# Patient Record
Sex: FEMALE | Race: OTHER | Employment: UNEMPLOYED | URBAN - METROPOLITAN AREA
[De-identification: names, ages, dates, MRNs, and addresses within clinical notes are randomized per-mention and may not be internally consistent; named-entity substitution may affect disease eponyms.]

---

## 2023-11-03 ENCOUNTER — TELEPHONE (OUTPATIENT)
Age: 28
End: 2023-11-03

## 2023-11-10 ENCOUNTER — NURSE ONLY (OUTPATIENT)
Age: 28
End: 2023-11-10

## 2023-11-10 DIAGNOSIS — Z71.9 COUNSELED BY NURSE: ICD-10-CM

## 2023-11-10 DIAGNOSIS — Z71.89 COUNSELING AND COORDINATION OF CARE: Primary | ICD-10-CM

## 2023-11-10 NOTE — PROGRESS NOTES
Encounter completed via telephone call for enrollment in to [facility] Every [address] and coordination of care. Patient was referred by [facility] department and Dr. Elenita Anguiano and at [facility] due to a recent abnormal pap smear (needs to be scan in to Media). Patient had already called our screening line and meets qualifications for the [facility] program.  The  [employer] participation agreement was read to the patient over the phone. All questions answered. Patient would like to be enrolled in [facility] program and have Colposcopy Procedure. Colposcopy preparation instructions were reviewed. Patient would like to be seen as soon as possible and does not have a preference in day of the week or Gender of provider. Next available appt. Was scheduled for patient and a text message as requested by patient was sent. A [facility] packet was sent to patient to the address on file. All questions were answered, patient verbalized understating and agrees with plan. Dash Key RN      EV[employer] HISTORY QUESTIONNAIRE       No Yes Comments   Has a doctor ever seen or felt anything wrong with your breast? [x]                                  []                                     Have you ever had a breast biopsy? [x]                                  []                                          When and where was last mammogram performed? N/a    Have you ever been told that there was a problem on your mammogram?   No Yes Comments   []                                  []                                  N/a     Do you have breast implants? No Yes Comments   []                                  [x]                                       When was your last Pap test performed? 10/25/2023 - [facility] department     Have you ever had an abnormal Pap test?   No Yes Comments   [x]                                  []                                       Have you had a hysterectomy?    No Yes Comments (why)   [x]

## 2023-12-14 ENCOUNTER — OFFICE VISIT (OUTPATIENT)
Age: 28
End: 2023-12-14

## 2023-12-14 VITALS — WEIGHT: 132 LBS | SYSTOLIC BLOOD PRESSURE: 104 MMHG | BODY MASS INDEX: 20.48 KG/M2 | DIASTOLIC BLOOD PRESSURE: 60 MMHG

## 2023-12-14 DIAGNOSIS — R87.613 HGSIL (HIGH GRADE SQUAMOUS INTRAEPITHELIAL LESION) ON PAP SMEAR OF CERVIX: Primary | ICD-10-CM

## 2023-12-14 LAB
HCG, PREGNANCY, URINE, POC: NEGATIVE
VALID INTERNAL CONTROL, POC: YES

## 2023-12-14 NOTE — PROGRESS NOTES
breathing    Cardiovascular  Extremities: no peripheral edema    Gastrointestinal  Abdominal Examination: abdomen non-distended, non-tender to palpation, no masses present  Liver and spleen: no hepatomegaly present, spleen not palpable  Hernias: no hernias identified    Genitourinary  See below for procedure note    Skin  General Inspection: no rash, no lesions identified    Neurologic/Psychiatric  Mental Status:  Orientation: grossly oriented to person, place and time  Mood and Affect: mood normal, affect appropriate      Assessment/Plan:    1. HGSIL (high grade squamous intraepithelial lesion) on Pap smear of cervix  - AL COLPOSCOPY CERVIX BX CERVIX & ENDOCRV CURRETAGE  - AMB POC URINE PREGNANCY TEST, VISUAL COLOR COMPARISON  - Surgical Pathology; Future      Discussed her pap results and the recommendation for colposcopy. We discussed the colposcopy procedure. We discussed HPV and the pathogenesis of cervical dysplasia. Discussed the possible biopsy results and the management plan pending the results of her colposcopic biopsies - including follow-up cotesting or LEEP procedure. Will call with pathology results. Man Plummer MD      Procedure Note: Colposcopy    Olive Christianson is a  33 y.o.  female She presents for colposcopy for evaluation of a cervical abnormality with a pap smear abnormality consisting of HSIL. After being presented with the risks, benefits and alternatives has she signed a consent for the procedure. She states that she understands the need for the procedure and has no further questions. She was informed that she may experience discomfort. Procedure: She was positioned in the dorsal lithotomy position and a speculum was inserted into the vagina. Dilute acetic acid was applied to the cervix. The colposcope was used to visualize the cervix. Procedure: The transformation zone was completely visualized. Findings: This colposcopy was satisfactory.  The

## 2023-12-19 ENCOUNTER — PREP FOR PROCEDURE (OUTPATIENT)
Facility: HOSPITAL | Age: 28
End: 2023-12-19

## 2023-12-19 DIAGNOSIS — D06.9 CIN III (CERVICAL INTRAEPITHELIAL NEOPLASIA III): ICD-10-CM

## 2023-12-19 DIAGNOSIS — N87.1 CERVICAL INTRAEPITHELIAL NEOPLASIA II: ICD-10-CM

## 2023-12-20 ENCOUNTER — PREP FOR PROCEDURE (OUTPATIENT)
Facility: HOSPITAL | Age: 28
End: 2023-12-20

## 2023-12-20 DIAGNOSIS — N87.1 CERVICAL INTRAEPITHELIAL NEOPLASIA II: Primary | ICD-10-CM

## 2023-12-20 DIAGNOSIS — D06.9 CIN III (CERVICAL INTRAEPITHELIAL NEOPLASIA III): ICD-10-CM

## 2024-01-02 ENCOUNTER — TELEPHONE (OUTPATIENT)
Age: 29
End: 2024-01-02

## 2024-01-02 NOTE — TELEPHONE ENCOUNTER
Patient calling main office stating that she has received a bill for about $472 for her Colposcopy on 12/14/2023. I have reviewed the chart and will send a message to emsemble today asking to direct charge to [facility] corporate account. Discussed that she may get a bill next month due to that this print automatically every 30 days, patient should not received any more bills by the end of Feb. 2024 if she does, pt is to call us again to let us know. Pt verbalized understanding. Pt would like to know where she could go to get her Depo- provera, per chart reviewed she was referred to our [facility] program from [facility] so I have instructed the patient to follow up with them, pt stated that she will. Pt thankful for assistance. Mimi Yañez RN

## 2024-01-10 ENCOUNTER — TELEPHONE (OUTPATIENT)
Age: 29
End: 2024-01-10

## 2024-01-10 NOTE — TELEPHONE ENCOUNTER
The [facility]  was used. Pt was calling about bill. Reviewed with pt about message from 1/2/2024. Pt verbalizes understanding that colpo bill will be covered under [facility]. Pt also asking if program pays for vaginal ultrasound, explained that our program does not cover that . Pt is LILIAAN pt, so explained that she can discuss with them about getting financial assistance from [facility], Pt verbalized understanding and denies questions. VERNON HEAD, RN

## 2024-01-10 NOTE — TELEPHONE ENCOUNTER
Tc to the pt responding to her MyChart msg. She is requesting to reschedule her U/S appt. She was scheduled for [facility] first available is Fri 01/19/24, @ 10:00 am with 0930 am arrival. Building #1 outpatient registration. Bring your photo ID. The pt needs t arrive with a full bladder. She needs to drink clear liquids 32-48 hrs 1 hr before the test. The pt questioned asking where she is to go to have the BC injection Depo-vera. The pt was informed the [facility] does not do BC injections. She should check with her local HD. Kimi Costa RN

## 2024-01-19 ENCOUNTER — HOSPITAL ENCOUNTER (OUTPATIENT)
Facility: HOSPITAL | Age: 29
End: 2024-01-19
Attending: FAMILY MEDICINE

## 2024-01-19 ENCOUNTER — HOSPITAL ENCOUNTER (OUTPATIENT)
Facility: HOSPITAL | Age: 29
Discharge: HOME | End: 2024-01-19
Attending: FAMILY MEDICINE

## 2024-01-19 DIAGNOSIS — N92.6 IRREGULAR MENSTRUATION: ICD-10-CM

## 2024-01-19 DIAGNOSIS — R10.31 BILATERAL LOWER ABDOMINAL DISCOMFORT: ICD-10-CM

## 2024-01-19 DIAGNOSIS — R10.32 BILATERAL LOWER ABDOMINAL DISCOMFORT: ICD-10-CM

## 2024-01-19 PROCEDURE — 76830 TRANSVAGINAL US NON-OB: CPT

## 2024-01-19 PROCEDURE — 76856 US EXAM PELVIC COMPLETE: CPT

## 2024-01-23 ENCOUNTER — TELEPHONE (OUTPATIENT)
Age: 29
End: 2024-01-23

## 2024-01-23 NOTE — TELEPHONE ENCOUNTER
Called pt to follow up on message about bills. Explained that an OW will call her to schedule an appt.

## 2024-01-25 ENCOUNTER — TELEPHONE (OUTPATIENT)
Age: 29
End: 2024-01-25

## 2024-01-25 NOTE — H&P
Gynecology History and Physical    Name: Kiesha Nichole MRN: [id number] SSN: [id number]    YOB: 1995  Age: 28 y.o.  Sex: female       Subjective:      Chief complaint:  Cervical dysplasia    Kiesha is a 28 y.o.  female with a history of dysplasia.     She had a pap test that demonstrated HSIL. Colposcopy was done that demonstrated    Surgical pathology:  1. Cervix, 12:00, biopsy: Focal detached fragments of high-grade squamous intraepithelial lesion (QUINTON II/III).   2. Cervix, 7:00, biopsy: Focal low-grade squamous intraepithelial lesion (QUINTON I).   3. Endocervix, endocervical curettage: Focal detached fragments of high-grade squamous intraepithelial lesion (QUINTON II/III).     She is admitted for Procedure(s) (LRB):  COLD KNIFE CONIZATION (N/A).    The current method of family planning is Depo-Provera injections.    OB History          1    Para   1    Term   1       0    AB   0    Living   1         SAB   0    IAB   0    Ectopic        Molar        Multiple        Live Births                  Past Medical History:   Diagnosis Date    HGSIL on cytologic smear of cervix      Past Surgical History:   Procedure Laterality Date    ABDOMINOPLASTY      BREAST ENHANCEMENT SURGERY       Social History     Occupational History    Not on file   Tobacco Use    Smoking status: Never    Smokeless tobacco: Never   Vaping Use    Vaping Use: Never used   Substance and Sexual Activity    Alcohol use: Not Currently     Comment: occassional    Drug use: Never    Sexual activity: Yes     Partners: Male     Birth control/protection: Injection     No family history on file.     No Known Allergies  Prior to Admission medications    Medication Sig Start Date End Date Taking? Authorizing Provider   medroxyPROGESTERone (DEPO-PROVERA) 150 MG/ML injection Inject 1 mL into the muscle every 3 months    ProviderJorge MD   folic acid (FOLVITE) 400 MCG tablet Take 1 tablet by mouth daily     Provider, MD Jorge        Review of Systems:  A comprehensive review of systems was negative except for that written in the History of Present Illness.     Objective:     There were no vitals filed for this visit.    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 The patient understands the risks; any and all questions were answered to the patient's satisfaction.    Signed By:  Julia Topete MD     January 25, 2024

## 2024-01-25 NOTE — TELEPHONE ENCOUNTER
----- Message from Kiesha Nichole sent at 1/25/2024 10:36 AM EST -----  Regarding: Resultados ecografías   Contact: [phone]  Buen día Doctora, sería hargrove zee de decirme cómo ve los resultados de los exámenes  muchas greg

## 2024-01-26 ENCOUNTER — ANESTHESIA (OUTPATIENT)
Facility: HOSPITAL | Age: 29
End: 2024-01-26

## 2024-01-26 ENCOUNTER — HOSPITAL ENCOUNTER (OUTPATIENT)
Facility: HOSPITAL | Age: 29
Setting detail: OUTPATIENT SURGERY
Discharge: HOME | End: 2024-01-26
Attending: OBSTETRICS & GYNECOLOGY | Admitting: OBSTETRICS & GYNECOLOGY

## 2024-01-26 ENCOUNTER — ANESTHESIA EVENT (OUTPATIENT)
Facility: HOSPITAL | Age: 29
End: 2024-01-26

## 2024-01-26 VITALS
DIASTOLIC BLOOD PRESSURE: 60 MMHG | OXYGEN SATURATION: 99 % | TEMPERATURE: 98.2 F | RESPIRATION RATE: 12 BRPM | SYSTOLIC BLOOD PRESSURE: 111 MMHG | HEART RATE: 66 BPM

## 2024-01-26 DIAGNOSIS — D06.9 CIN III (CERVICAL INTRAEPITHELIAL NEOPLASIA III): ICD-10-CM

## 2024-01-26 DIAGNOSIS — N87.1 CERVICAL INTRAEPITHELIAL NEOPLASIA II: ICD-10-CM

## 2024-01-26 LAB — HCG UR QL: NEGATIVE

## 2024-01-26 PROCEDURE — 6370000000 HC RX 637 (ALT 250 FOR IP): Performed by: ANESTHESIOLOGY

## 2024-01-26 PROCEDURE — 2580000003 HC RX 258: Performed by: ANESTHESIOLOGY

## 2024-01-26 PROCEDURE — 2500000003 HC RX 250 WO HCPCS: Performed by: OBSTETRICS & GYNECOLOGY

## 2024-01-26 PROCEDURE — 88307 TISSUE EXAM BY PATHOLOGIST: CPT

## 2024-01-26 PROCEDURE — 3700000001 HC ADD 15 MINUTES (ANESTHESIA): Performed by: OBSTETRICS & GYNECOLOGY

## 2024-01-26 PROCEDURE — 6370000000 HC RX 637 (ALT 250 FOR IP): Performed by: OBSTETRICS & GYNECOLOGY

## 2024-01-26 PROCEDURE — 7100000010 HC PHASE II RECOVERY - FIRST 15 MIN: Performed by: OBSTETRICS & GYNECOLOGY

## 2024-01-26 PROCEDURE — 88305 TISSUE EXAM BY PATHOLOGIST: CPT

## 2024-01-26 PROCEDURE — 3600000002 HC SURGERY LEVEL 2 BASE: Performed by: OBSTETRICS & GYNECOLOGY

## 2024-01-26 PROCEDURE — 57520 CONIZATION OF CERVIX: CPT | Performed by: OBSTETRICS & GYNECOLOGY

## 2024-01-26 PROCEDURE — 6360000002 HC RX W HCPCS: Performed by: NURSE ANESTHETIST, CERTIFIED REGISTERED

## 2024-01-26 PROCEDURE — 7100000011 HC PHASE II RECOVERY - ADDTL 15 MIN: Performed by: OBSTETRICS & GYNECOLOGY

## 2024-01-26 PROCEDURE — 3600000012 HC SURGERY LEVEL 2 ADDTL 15MIN: Performed by: OBSTETRICS & GYNECOLOGY

## 2024-01-26 PROCEDURE — 2500000003 HC RX 250 WO HCPCS: Performed by: NURSE ANESTHETIST, CERTIFIED REGISTERED

## 2024-01-26 PROCEDURE — 3700000000 HC ANESTHESIA ATTENDED CARE: Performed by: OBSTETRICS & GYNECOLOGY

## 2024-01-26 PROCEDURE — 7100000000 HC PACU RECOVERY - FIRST 15 MIN: Performed by: OBSTETRICS & GYNECOLOGY

## 2024-01-26 PROCEDURE — 81025 URINE PREGNANCY TEST: CPT

## 2024-01-26 PROCEDURE — 2709999900 HC NON-CHARGEABLE SUPPLY: Performed by: OBSTETRICS & GYNECOLOGY

## 2024-01-26 PROCEDURE — 7100000001 HC PACU RECOVERY - ADDTL 15 MIN: Performed by: OBSTETRICS & GYNECOLOGY

## 2024-01-26 RX ORDER — ACETIC ACID 5 %
LIQUID (ML) MISCELLANEOUS PRN
Status: DISCONTINUED | OUTPATIENT
Start: 2024-01-26 | End: 2024-01-26 | Stop reason: HOSPADM

## 2024-01-26 RX ORDER — FENTANYL CITRATE 50 UG/ML
INJECTION, SOLUTION INTRAMUSCULAR; INTRAVENOUS PRN
Status: DISCONTINUED | OUTPATIENT
Start: 2024-01-26 | End: 2024-01-26 | Stop reason: SDUPTHER

## 2024-01-26 RX ORDER — PROPOFOL 10 MG/ML
INJECTION, EMULSION INTRAVENOUS PRN
Status: DISCONTINUED | OUTPATIENT
Start: 2024-01-26 | End: 2024-01-26 | Stop reason: SDUPTHER

## 2024-01-26 RX ORDER — ONDANSETRON 2 MG/ML
INJECTION INTRAMUSCULAR; INTRAVENOUS PRN
Status: DISCONTINUED | OUTPATIENT
Start: 2024-01-26 | End: 2024-01-26 | Stop reason: SDUPTHER

## 2024-01-26 RX ORDER — MIDAZOLAM HYDROCHLORIDE 2 MG/2ML
2 INJECTION, SOLUTION INTRAMUSCULAR; INTRAVENOUS
Status: DISCONTINUED | OUTPATIENT
Start: 2024-01-26 | End: 2024-01-26 | Stop reason: HOSPADM

## 2024-01-26 RX ORDER — SODIUM CHLORIDE 9 MG/ML
INJECTION, SOLUTION INTRAVENOUS PRN
Status: DISCONTINUED | OUTPATIENT
Start: 2024-01-26 | End: 2024-01-26 | Stop reason: HOSPADM

## 2024-01-26 RX ORDER — ONDANSETRON 2 MG/ML
4 INJECTION INTRAMUSCULAR; INTRAVENOUS
Status: DISCONTINUED | OUTPATIENT
Start: 2024-01-26 | End: 2024-01-26 | Stop reason: HOSPADM

## 2024-01-26 RX ORDER — PROCHLORPERAZINE EDISYLATE 5 MG/ML
5 INJECTION INTRAMUSCULAR; INTRAVENOUS
Status: DISCONTINUED | OUTPATIENT
Start: 2024-01-26 | End: 2024-01-26 | Stop reason: HOSPADM

## 2024-01-26 RX ORDER — SODIUM CHLORIDE 0.9 % (FLUSH) 0.9 %
5-40 SYRINGE (ML) INJECTION EVERY 12 HOURS SCHEDULED
Status: DISCONTINUED | OUTPATIENT
Start: 2024-01-26 | End: 2024-01-26 | Stop reason: HOSPADM

## 2024-01-26 RX ORDER — HYDRALAZINE HYDROCHLORIDE 20 MG/ML
10 INJECTION INTRAMUSCULAR; INTRAVENOUS
Status: DISCONTINUED | OUTPATIENT
Start: 2024-01-26 | End: 2024-01-26 | Stop reason: HOSPADM

## 2024-01-26 RX ORDER — LIDOCAINE HYDROCHLORIDE AND EPINEPHRINE 10; 10 MG/ML; UG/ML
INJECTION, SOLUTION INFILTRATION; PERINEURAL PRN
Status: DISCONTINUED | OUTPATIENT
Start: 2024-01-26 | End: 2024-01-26 | Stop reason: HOSPADM

## 2024-01-26 RX ORDER — KETOROLAC TROMETHAMINE 30 MG/ML
INJECTION, SOLUTION INTRAMUSCULAR; INTRAVENOUS PRN
Status: DISCONTINUED | OUTPATIENT
Start: 2024-01-26 | End: 2024-01-26 | Stop reason: SDUPTHER

## 2024-01-26 RX ORDER — FENTANYL CITRATE 50 UG/ML
25 INJECTION, SOLUTION INTRAMUSCULAR; INTRAVENOUS EVERY 5 MIN PRN
Status: DISCONTINUED | OUTPATIENT
Start: 2024-01-26 | End: 2024-01-26 | Stop reason: HOSPADM

## 2024-01-26 RX ORDER — LIDOCAINE HYDROCHLORIDE 10 MG/ML
1 INJECTION, SOLUTION EPIDURAL; INFILTRATION; INTRACAUDAL; PERINEURAL
Status: DISCONTINUED | OUTPATIENT
Start: 2024-01-26 | End: 2024-01-26 | Stop reason: HOSPADM

## 2024-01-26 RX ORDER — OXYCODONE HYDROCHLORIDE 5 MG/1
5 TABLET ORAL
Status: DISCONTINUED | OUTPATIENT
Start: 2024-01-26 | End: 2024-01-26 | Stop reason: HOSPADM

## 2024-01-26 RX ORDER — SODIUM CHLORIDE 0.9 % (FLUSH) 0.9 %
5-40 SYRINGE (ML) INJECTION PRN
Status: DISCONTINUED | OUTPATIENT
Start: 2024-01-26 | End: 2024-01-26 | Stop reason: HOSPADM

## 2024-01-26 RX ORDER — FENTANYL CITRATE 50 UG/ML
100 INJECTION, SOLUTION INTRAMUSCULAR; INTRAVENOUS
Status: DISCONTINUED | OUTPATIENT
Start: 2024-01-26 | End: 2024-01-26 | Stop reason: HOSPADM

## 2024-01-26 RX ORDER — IBUPROFEN 800 MG/1
800 TABLET ORAL 3 TIMES DAILY
Qty: 120 TABLET | Refills: 0 | Status: SHIPPED | OUTPATIENT
Start: 2024-01-26

## 2024-01-26 RX ORDER — ACETAMINOPHEN 500 MG
1000 TABLET ORAL 3 TIMES DAILY
Qty: 120 TABLET | Refills: 0 | Status: SHIPPED | OUTPATIENT
Start: 2024-01-26

## 2024-01-26 RX ORDER — ACETAMINOPHEN 500 MG
1000 TABLET ORAL ONCE
Status: COMPLETED | OUTPATIENT
Start: 2024-01-26 | End: 2024-01-26

## 2024-01-26 RX ORDER — HYDROMORPHONE HYDROCHLORIDE 1 MG/ML
0.5 INJECTION, SOLUTION INTRAMUSCULAR; INTRAVENOUS; SUBCUTANEOUS EVERY 5 MIN PRN
Status: DISCONTINUED | OUTPATIENT
Start: 2024-01-26 | End: 2024-01-26 | Stop reason: HOSPADM

## 2024-01-26 RX ORDER — DEXAMETHASONE SODIUM PHOSPHATE 4 MG/ML
INJECTION, SOLUTION INTRA-ARTICULAR; INTRALESIONAL; INTRAMUSCULAR; INTRAVENOUS; SOFT TISSUE PRN
Status: DISCONTINUED | OUTPATIENT
Start: 2024-01-26 | End: 2024-01-26 | Stop reason: SDUPTHER

## 2024-01-26 RX ORDER — LUGOLS 0.4 G/G
LIQUID TOPICAL PRN
Status: DISCONTINUED | OUTPATIENT
Start: 2024-01-26 | End: 2024-01-26 | Stop reason: HOSPADM

## 2024-01-26 RX ORDER — LIDOCAINE HYDROCHLORIDE 20 MG/ML
INJECTION, SOLUTION EPIDURAL; INFILTRATION; INTRACAUDAL; PERINEURAL PRN
Status: DISCONTINUED | OUTPATIENT
Start: 2024-01-26 | End: 2024-01-26 | Stop reason: SDUPTHER

## 2024-01-26 RX ORDER — SODIUM CHLORIDE, SODIUM LACTATE, POTASSIUM CHLORIDE, CALCIUM CHLORIDE 600; 310; 30; 20 MG/100ML; MG/100ML; MG/100ML; MG/100ML
INJECTION, SOLUTION INTRAVENOUS CONTINUOUS
Status: DISCONTINUED | OUTPATIENT
Start: 2024-01-26 | End: 2024-01-26 | Stop reason: HOSPADM

## 2024-01-26 RX ORDER — FERRIC SUBSULFATE 20-22G/100
SOLUTION, NON-ORAL MISCELLANEOUS PRN
Status: DISCONTINUED | OUTPATIENT
Start: 2024-01-26 | End: 2024-01-26 | Stop reason: HOSPADM

## 2024-01-26 RX ORDER — MIDAZOLAM HYDROCHLORIDE 1 MG/ML
INJECTION INTRAMUSCULAR; INTRAVENOUS PRN
Status: DISCONTINUED | OUTPATIENT
Start: 2024-01-26 | End: 2024-01-26 | Stop reason: SDUPTHER

## 2024-01-26 RX ADMIN — ONDANSETRON 4 MG: 2 INJECTION INTRAMUSCULAR; INTRAVENOUS at 11:05

## 2024-01-26 RX ADMIN — FENTANYL CITRATE 50 MCG: 50 INJECTION, SOLUTION INTRAMUSCULAR; INTRAVENOUS at 11:20

## 2024-01-26 RX ADMIN — PROPOFOL 200 MG: 10 INJECTION, EMULSION INTRAVENOUS at 10:40

## 2024-01-26 RX ADMIN — ACETAMINOPHEN 1000 MG: 500 TABLET ORAL at 09:35

## 2024-01-26 RX ADMIN — LIDOCAINE HYDROCHLORIDE 60 MG: 20 INJECTION, SOLUTION EPIDURAL; INFILTRATION; INTRACAUDAL; PERINEURAL at 10:40

## 2024-01-26 RX ADMIN — SODIUM CHLORIDE, POTASSIUM CHLORIDE, SODIUM LACTATE AND CALCIUM CHLORIDE: 600; 310; 30; 20 INJECTION, SOLUTION INTRAVENOUS at 09:44

## 2024-01-26 RX ADMIN — KETOROLAC TROMETHAMINE 30 MG: 30 INJECTION INTRAMUSCULAR; INTRAVENOUS at 11:05

## 2024-01-26 RX ADMIN — FENTANYL CITRATE 50 MCG: 50 INJECTION, SOLUTION INTRAMUSCULAR; INTRAVENOUS at 10:40

## 2024-01-26 RX ADMIN — MIDAZOLAM 2 MG: 1 INJECTION INTRAMUSCULAR; INTRAVENOUS at 10:30

## 2024-01-26 RX ADMIN — DEXAMETHASONE SODIUM PHOSPHATE 8 MG: 4 INJECTION INTRA-ARTICULAR; INTRALESIONAL; INTRAMUSCULAR; INTRAVENOUS; SOFT TISSUE at 10:50

## 2024-01-26 ASSESSMENT — PAIN - FUNCTIONAL ASSESSMENT: PAIN_FUNCTIONAL_ASSESSMENT: NONE - DENIES PAIN

## 2024-01-26 NOTE — ANESTHESIA PRE PROCEDURE
Department of Anesthesiology  Preprocedure Note       Name:  Kiesha Nichole   Age:  28 y.o.  :  1995                                          MRN:  [id number]         Date:  2024      Surgeon: Surgeon(s):  Julia Topete MD Sharp, Stacey L, MD    Procedure: Procedure(s):  COLD KNIFE CONIZATION    Medications prior to admission:   Prior to Admission medications    Medication Sig Start Date End Date Taking? Authorizing Provider   medroxyPROGESTERone (DEPO-PROVERA) 150 MG/ML injection Inject 1 mL into the muscle every 3 months    ProviderJorge MD   folic acid (FOLVITE) 400 MCG tablet Take 1 tablet by mouth daily    ProviderJorge MD       Current medications:    No current facility-administered medications for this encounter.       Allergies:  Not on File    Problem List:    Patient Active Problem List   Diagnosis Code   • Cervical intraepithelial neoplasia II N87.1   • QUITNON III (cervical intraepithelial neoplasia III) D06.9       Past Medical History:        Diagnosis Date   • HGSIL on cytologic smear of cervix        Past Surgical History:        Procedure Laterality Date   • ABDOMINOPLASTY     • BREAST ENHANCEMENT SURGERY         Social History:    Social History     Tobacco Use   • Smoking status: Never   • Smokeless tobacco: Never   Substance Use Topics   • Alcohol use: Not Currently     Comment: occassional                                Counseling given: Not Answered      Vital Signs (Current): There were no vitals filed for this visit.                                           BP Readings from Last 3 Encounters:   23 104/60   23 120/76       NPO Status:                                                                                 BMI:   Wt Readings from Last 3 Encounters:   23 59.9 kg (132 lb)   23 61.3 kg (135 lb 1.6 oz)     There is no height or weight on file to calculate BMI.    CBC: No results found for: \"WBC\", \"RBC\", \"HGB\", \"HCT\", \"MCV\",

## 2024-01-26 NOTE — DISCHARGE SUMMARY
Gynecology Discharge Summary     Patient ID:  Kiesha Nichole  [id number]  28 y.o.  1995    Admit date: 1/26/2024    Discharge date: 1/26/2024     Admission Diagnoses:   Patient Active Problem List   Diagnosis    Cervical intraepithelial neoplasia II    QUINTON III (cervical intraepithelial neoplasia III)       Discharge Diagnoses: No discharge information exists for this patient.  Patient Active Problem List   Diagnosis    Cervical intraepithelial neoplasia II    QUINTON III (cervical intraepithelial neoplasia III)       Procedures for this admission: Procedure(s):  COLD KNIFE CONIZATION    Hospital Course:    Disposition: Home or self care    Discharged Condition: stable            Patient Instructions:   Current Discharge Medication List        START taking these medications    Details   ibuprofen (ADVIL;MOTRIN) 800 MG tablet Take 1 tablet by mouth in the morning, at noon, and at bedtime  Qty: 120 tablet, Refills: 0      acetaminophen (TYLENOL) 500 MG tablet Take 2 tablets by mouth in the morning, at noon, and at bedtime  Qty: 120 tablet, Refills: 0           CONTINUE these medications which have NOT CHANGED    Details   medroxyPROGESTERone (DEPO-PROVERA) 150 MG/ML injection Inject 1 mL into the muscle every 3 months      folic acid (FOLVITE) 400 MCG tablet Take 1 tablet by mouth daily           Activity: activity as tolerated and no sex for 2 weeks  Diet: regular diet  Wound Care: keep wound clean and dry    Follow-up with Dr. Topete in 2 weeks.    Signed:  Julia Topete MD  1/26/2024  11:27 AM

## 2024-01-26 NOTE — DISCHARGE INSTRUCTIONS
USTED RECIBIÓ IV TORADOL A LAS 11 A.M. ZOLTAN MEDICAMENTO ES SIMILAR A MOTRIN/IBUPROFEN/ALEVE. PUEDE LEIDY MOTRIN/IBUPROFEN/ALEVE ADICIONAL NO ANTES DE LAS 5 PM.    Después de la anestesia hassan primera comida debe ser ligera. Evite los alimentos grasosos o picantes. Continúe con hassan dieta habitual según lo tolere.    Instrucciones de ethan de anestesia    Después de anestesia general o sedación intervencionista, garth 24 horas o mientras esté tomando narcóticos recetados:  ? Limita tus actividades  ? No conduzca ni opere maquinaria peligrosa  ? Si no ha orinado dentro de las 8 horas posteriores al ethan, comuníquese con hassan cirujano de ange.  ? No tomes decisiones personales o comerciales importantes.  ? No bebas bebidas alcohólicas    Informe lo siguiente a hassan cirujano:  ? Dolor excesivo, hinchazón, enrojecimiento u olor de o alrededor del área quirúrgica  ? Temperatura superior a 100,5 grados  ? Náuseas y vómitos que brown más de 4 horas o si no se pueden leidy medicamentos  ? Cualquier signo de disminución de la circulación o deterioro de los nervios en las extremidades: cambio de color, entumecimiento persistente, hormigueo, frialdad o aumento del dolor.  ? Alguna pregunta      Instrucciones de cuidado posterior para la biopsia con cono Leep      1. Normalmente, después de zoltan procedimiento, el dolor es mínimo. Sin embargo, es posible que experimente algunas molestias sordas y tipo calambres en la parte inferior del abdomen que pueden aliviarse con Tylenol o Motrin. Si desarrolla dolor severo, notifique a la oficina al [phone]    2. Es normal experimentar algo de manchado o incluso un ligero sangrado. Esta secreción puede ocurrir garth varios días después del procedimiento. Si el sangrado excede remojar ector toalla sanitaria cada 2 horas o expulsar coágulos de nimco, debe comunicarse con el consultorio.    3. Evite colocar cualquier cosa en hassan vagina. No se duche ni use tampones. Las relaciones sexuales  pueden ser incómodas y provocar sangrado y/o infección. Estas restricciones se levantarán después de que hassan médico lo haya visto en hassan visita posoperatoria.    4. Puedes ducharte. Evite usar ector quinton, piscina o jacuzzi hasta después de hassan chequeo.    5. Notifique a la oficina si tiene fiebre con ector temperatura superior a 100.4. También debe notificar al consultorio si presenta dolor abdominal intenso, sangrado vaginal abundante o flujo vaginal con mal olor.    6. Es difícil predecir cuándo ocurrirá hassan próximo período menstrual ya que hassan cuerpo balbuena sufrido un estrés importante. Hasasn período debería regularse por sí solo dentro de las primeras 6 semanas posteriores a la operación.    7. No yuliana ejercicio extenuante garth las primeras 2 semanas después del procedimiento. Luego podrá reanudar toda la actividad habitual.    Hassan yvonne de seguimiento debería ser en 4 a 6 semanas. Comuníquese con la oficina al [phone] si aún no se balbuena programado ector yvonne.          YOU RECEIVED IV TORADOL  AT 11 AM. THIS MEDICATION IS SIMILAR TO MOTRIN/IBUPROFEN/ALEVE. MAY TAKE ADDITIONAL MOTRIN/IBUPROFEN/ALEVE NO SOONER THAN 5 PM.    After anesthesia your first meal should be light. Avoid foods that are greasy or spicy. Progress to your regular diet as tolerated.    Anesthesia Discharge Instructions    After general anesthesia or intervenous sedation, for 24 hours or while taking prescription Narcotics:  Limit your activities  Do not drive or operate hazardous machinery  If you have not urinated within 8 hours after discharge, please contact your surgeon on call.  Do not make important personal or business decisions  Do not drink alcoholic beverages    Report the following to your surgeon:  Excessive pain, swelling, redness or odor of or around the surgical area  Temperature over 100.5 degrees  Nausea and vomiting lasting longer than 4 hours or if unable to take medication  Any signs of decreased circulation or nerve impairment to

## 2024-01-26 NOTE — OP NOTE
Operative Note      Patient: Kiesha Nichole  YOB: 1995  MRN: [id number]    Date of Procedure: 2024    Pre-Op Diagnosis Codes:     * Cervical intraepithelial neoplasia II [N87.1]     * QUINTON III (cervical intraepithelial neoplasia III) [D06.9]    Post-Op Diagnosis: Same       Procedure(s):  COLD KNIFE CONIZATION    Surgeon(s):  Julia Topete MD Sharp, Stacey L, MD Dr. Sharp, the assistant surgeon, was present during the procedure.  The physician's presence was necessary due to cold knife conization.The assistant surgeon performed significant portions of the surgery, including incising tissue, clamping, control of bleeding with suturing and cauterization, and decision making at challenging portions of the procedure.  This assistance was necessary to accomplish the optimal outcome for the patient, above and beyond what a non-MD assistant could have provided.      Assistant:   * No surgical staff found *    Anesthesia: General    Estimated Blood Loss (mL): Minimal    Complications: None    Specimens:   ID Type Source Tests Collected by Time Destination   1 : Cervical cone stitch at 12 o'clock Tissue Cervix SURGICAL PATHOLOGY Julia Topete MD 2024 1107    2 : POST CONE ENDOCERVICAL CURETTING Tissue Cervix SURGICAL PATHOLOGY Julia Topete MD 2024 1116      Implants:  * No implants in log *      Drains: * No LDAs found *    .    SPECIMENS:  ID Type Source Tests Collected by Time Destination   1 : Cervical cone stitch at 12 o'clock Tissue Cervix SURGICAL PATHOLOGY Julia Topete MD 2024 1107    2 : POST CONE ENDOCERVICAL CURETTING Tissue Cervix SURGICAL PATHOLOGY Julia Topete MD 2024 1116      1. Cone biopsy excision anchored with a stitch at 12 o'clock, opened at 3 'clock.  2. Endocervical curettings.    Indications for procedure:  Kiesha Nichole is a 28 y.o.  who has a history of QUINTON 2/3 on biopsy with positive ECC. She was taken to  the operating room for a cold knife cone biopsy.    Findings  Exam under anesthesia revealed a small, mobile, anteverted, and anteflexed uterus. There were no adnexal masses detected. The cervix appeared pink and smooth, and no lesions or masses were noted on cervical exam    Procedure Details   After a timeout correctly identified the patient, the procedure, and the members of the operative team, the patient was placed under general anesthesia. She was placed in the lithotomy position using candycane stirrups. Colposcopic examination was performed prior to surgical preparation. 5% acetic acid was applied and a colposcopy demonstrated acetowhite changes at 12 o'clock with associated non staining areas with application of lugol solution. The patient was then prepped and draped. The cervix was then infiltrated with 2% lidocaine with epinephrine (1: 200,000) with adequate blanching. [place] stitches of 2-0 Vicryl suture on a UR6 were placed at the 3 o'clock and 9 o'clock position lateral to the vaginal fornices. A cone-shaped incision was made around the os to include all non-staining areas. A U-stitch was then placed in the cone biopsy stroma minimizing epithelial manipulation and in order to tether the biopsy for excision. The rest of the excision was completed and the specimen was removed and prepared for pathology. ECC was obtained. Using a ball electrode, the base of the excision was fulgurated. Monsel solution was then applied for additional hemostasis ad stay sutures were tied together. Adequate hemostasis was then assured. Instruments were removed from the vagina and the patient was allowed to awake from anesthesia. The patient tolerated the procedure well.     Electronically signed by Julia Topete MD on 1/26/2024 at 11:20 AM

## 2024-01-26 NOTE — ANESTHESIA POSTPROCEDURE EVALUATION
Department of Anesthesiology  Postprocedure Note    Patient: Kiesha Nichole  MRN: [id number]  YOB: 1995  Date of evaluation: 1/26/2024    Procedure Summary       Date: 01/26/24 Room / Location: [facility] MAIN OR [address] [place] [address] MAIN OR    Anesthesia Start: 1034 Anesthesia Stop: 1135    Procedure: COLD KNIFE CONIZATION (Vagina ) Diagnosis:       Cervical intraepithelial neoplasia II      QUINTON III (cervical intraepithelial neoplasia III)      (Cervical intraepithelial neoplasia II [N87.1])      (QUINTON III (cervical intraepithelial neoplasia III) [D06.9])    Providers: Julia Topete MD Responsible Provider: Eli Petty DO    Anesthesia Type: General ASA Status: 1            Anesthesia Type: General    Becky Phase I: Becky Score: 8    Becky Phase II:      Anesthesia Post Evaluation    Patient location during evaluation: PACU  Level of consciousness: awake  Airway patency: patent  Nausea & Vomiting: no nausea  Cardiovascular status: hemodynamically stable  Respiratory status: acceptable  Hydration status: stable  Multimodal analgesia pain management approach  Pain management: adequate    No notable events documented.

## 2024-02-02 ENCOUNTER — OFFICE VISIT (OUTPATIENT)
Age: 29
End: 2024-02-02

## 2024-02-02 VITALS — DIASTOLIC BLOOD PRESSURE: 74 MMHG | SYSTOLIC BLOOD PRESSURE: 116 MMHG | WEIGHT: 137 LBS | BODY MASS INDEX: 21.25 KG/M2

## 2024-02-02 DIAGNOSIS — Z09 POSTOPERATIVE EXAMINATION: Primary | ICD-10-CM

## 2024-02-02 NOTE — PROGRESS NOTES
POSTOPERATIVE VISIT    CC:  postoperative visit    HPI:  Ms. Kiesha Nichole is a 28 y.o.  female who presents for a routine postoperative visit on 2024. She notes that she has not had any significant difficulties since her CKC. She notes that she did have some bleeding and brown flecks but this has resolved.    She underwent the following procedure on this date:  2024-COLD KNIFE CONIZATION     Indications for procedure: QUINTON II/QUINTON III on colposcopy in 2023    Operative findings: Exam under anesthesia revealed a small, mobile, anteverted, and anteflexed uterus. There were no adnexal masses detected. The cervix appeared pink and smooth, and no lesions or masses were noted on cervical exam      Her pathology report showed: 1.  Cervical cone: Focal severe dysplasia (QUINTON III), ecto- and endocervical margins free of dysplasia.   2.  Post cone endocervical curettage: Fragments of benign endocervical mucosa.     Past Medical History:   Diagnosis Date    HGSIL on cytologic smear of cervix        Past Surgical History:   Procedure Laterality Date    ABDOMINOPLASTY      BREAST ENHANCEMENT SURGERY      CERVIX BIOPSY N/A 2024    COLD KNIFE CONIZATION performed by Julia Topete MD at [facility] MAIN OR       History reviewed. No pertinent family history.    Social History     Socioeconomic History    Marital status: Single     Spouse name: Not on file    Number of children: Not on file    Years of education: Not on file    Highest education level: Not on file   Occupational History    Not on file   Tobacco Use    Smoking status: Never    Smokeless tobacco: Never   Vaping Use    Vaping Use: Never used   Substance and Sexual Activity    Alcohol use: Not Currently     Comment: occassional    Drug use: Never    Sexual activity: Yes     Partners: Male     Birth control/protection: Injection   Other Topics Concern    Not on file   Social History Narrative    Not on file     Social Determinants

## 2024-02-05 ENCOUNTER — NURSE ONLY (OUTPATIENT)
Age: 29
End: 2024-02-05

## 2024-02-05 NOTE — PROGRESS NOTES
Encounter completed in re: finalizing patient's referral in to the [facility] program.   Patient referred to us for colposcopy by the [facility]. Pt enrolled in [facility] program and was sent to have colposcopy on 12/14/2023- resulted HSIL. The provider was routed the office note and pathology  report  and pt was told of the results. Patient has been billed in [employer].  I also routed the patient's letter discharging her from [facility] program to [facility] so that they will know that patient will need a pap in one year. Julia Chu, RN

## 2024-02-06 ENCOUNTER — TELEPHONE (OUTPATIENT)
Age: 29
End: 2024-02-06

## 2024-02-06 NOTE — TELEPHONE ENCOUNTER
Chart reviewed in re: need to possibly assist pt with [facility] Financial Assistance. It is noted that patient started working with [facility]W at the [facility] for financial Assistance but has not completed application. Pt also met with Dr. Topete on 2.5/2024 in for post op [facility], pt was instructed of repeat pap smear in 1 year.   I called patient ID [id number]. The above was reviewed, pt will follow up with [facility] CHW to complete financial assistance discussed that there is a window of time to complete application. Discussed importance of the 1 year repeat pap smear. Pt states that she has a pending statement from [facility] of around $437 for DOS 12/14/2023. Will follow up in re this bill with our direct contact and will request charge be transfer to ewl. Mimi Yañez RN

## 2024-02-12 ENCOUNTER — TELEPHONE (OUTPATIENT)
Age: 29
End: 2024-02-12

## 2024-02-12 NOTE — TELEPHONE ENCOUNTER
CHW called patient 2x and was unable to speak with patient. Unable to leave a voice message. An automated message stating: \"The wireless customer that you are trying to call is not available. Please, try again later\".CHW sent a text message explaining the reason of the call and asking to call back.

## 2024-06-10 ENCOUNTER — TELEPHONE (OUTPATIENT)
Age: 29
End: 2024-06-10

## 2024-06-10 NOTE — TELEPHONE ENCOUNTER
It was noted that patient has not applied for [employer] assistance and has pending debt. Pt was initially seen for a colposcopy through [facility] 12/14/2023. Multiple conversations were had where pt was instructed that she would need to apply for [employer] Financial assistance. It seemed that as of 02/2024 pt had a pending application with CHW at [facility] but patient did not reach back. I sent a [employer] message and has not been read. I called today to home/mobile on file but a lady answered the phone and stated that the phone number did not longer belonged to the patient- and that she did not know the patient either. I called alternative contact and left voicemail to please ask patient for a call back. I am also sending a letter to call me back with [employer] assistance application and plain policy summary. Mimi Yañez RN

## 2025-01-03 NOTE — TELEPHONE ENCOUNTER
[facility] received a call from patient requesting an appointment for an abnormal pap. Previous Provider: Mitch Soler    When Diagnosed/Seen : 10/2023    Best day of the week for Gynecologist appointment? Any day    Do you prefer Morning or afternoon? Morning    1st day of your last menstrual period? 10/23/2023    Are your periods regular?  No        \"Staff at [facility] will try their best to schedule the appointment base on your preferences, but is not guatanteed due to availability, they will call you with further details and instructions within 5 business days if you do not hear from them \" occasional use

## (undated) DEVICE — GLOVE SURG SZ 65 L12IN FNGR THK94MIL STD WHT LTX FREE

## (undated) DEVICE — YANKAUER,TAPERED BULBOUS TIP,W/O VENT: Brand: MEDLINE

## (undated) DEVICE — APPLICATOR  COTTON-TIPPED 6 IN WOOD STRL

## (undated) DEVICE — TUBING, SUCTION, 1/4" X 10', STRAIGHT: Brand: MEDLINE

## (undated) DEVICE — ELECTRODE ES L11CM DIA5MM BALL SHFT RED DISP UTAHLOOP

## (undated) DEVICE — INTENT OT USE PROVIDES A STERILE INTERFACE BETWEEN THE OPERATING ROOM SURGICAL LAMPS (NON-STERILE) AND THE SURGEON OR STAFF WORKING IN THE STERILE FIELD.: Brand: ASPEN® ALC PLUS LIGHT HANDLE COVER

## (undated) DEVICE — PENCIL ES CRD L10FT HND SWCHING ROCK SWCH W/ EDGE COAT BLDE

## (undated) DEVICE — ELECTRODE PT RET AD L9FT HI MOIST COND ADH HYDRGEL CORDED

## (undated) DEVICE — GLOVE SURG SZ 65 L12IN FNGR THK79MIL GRN LTX FREE

## (undated) DEVICE — CATHETER,URETHRAL,REDRUBBER,STRL,18FR: Brand: MEDLINE

## (undated) DEVICE — SUTURE PERMAHAND SZ 3-0 L30IN NONABSORBABLE BLK SH L26MM K832H

## (undated) DEVICE — GYN LITHOTOMY: Brand: MEDLINE INDUSTRIES, INC.

## (undated) DEVICE — BLADE,CARBON-STEEL,11,STRL,DISPOSABLE,TB: Brand: MEDLINE

## (undated) DEVICE — SUTURE VCRL SZ 0 L27IN ABSRB UD L36MM CT-1 1/2 CIR J260H

## (undated) DEVICE — COUNTER NDL 20 COUNT HLD 40 DBL MAG ADH

## (undated) DEVICE — SUTURE VCRL SZ 2-0 L27IN ABSRB VLT L26MM UR-6 5/8 CIR J602H

## (undated) DEVICE — SWAB MEDICATED 8 IN PROCTO